# Patient Record
Sex: MALE | NOT HISPANIC OR LATINO | URBAN - METROPOLITAN AREA
[De-identification: names, ages, dates, MRNs, and addresses within clinical notes are randomized per-mention and may not be internally consistent; named-entity substitution may affect disease eponyms.]

---

## 2017-02-21 ENCOUNTER — IMPORTED ENCOUNTER (OUTPATIENT)
Dept: URBAN - METROPOLITAN AREA CLINIC 1 | Facility: CLINIC | Age: 82
End: 2017-02-21

## 2017-02-21 PROBLEM — H04.123: Noted: 2017-02-21

## 2017-02-21 PROCEDURE — 83861 MICROFLUID ANALY TEARS: CPT

## 2017-02-21 PROCEDURE — 99213 OFFICE O/P EST LOW 20 MIN: CPT

## 2017-02-21 NOTE — PATIENT DISCUSSION
1.  Dry Eyes OU --Tear lab was ordered and done today results was 326 OD and 312 OS mild OU. REC patient to cont using Restasis BID OU along with AT QID OU-Gave patient a sample of Systane Balance and Refresh Advanced. Will consider plugs if symptoms cont or worsen 2. Return for an appointment in 2 months for 10. with Dr. Nola Alarcon.

## 2017-07-27 ENCOUNTER — IMPORTED ENCOUNTER (OUTPATIENT)
Dept: URBAN - METROPOLITAN AREA CLINIC 1 | Facility: CLINIC | Age: 82
End: 2017-07-27

## 2017-07-27 PROBLEM — H04.123: Noted: 2017-07-27

## 2017-07-27 PROCEDURE — 99213 OFFICE O/P EST LOW 20 MIN: CPT

## 2017-07-27 NOTE — PATIENT DISCUSSION
1.  Dry Eyes OU -- REC patient to cont using Restasis BID OU and AT TID OU 2. Return for an appointment in 6 months for 30. with Dr. Dilan Kramer.

## 2017-12-12 ENCOUNTER — OTHER- (OUTPATIENT)
Dept: RURAL CLINIC 1 | Facility: CLINIC | Age: 82
Setting detail: DERMATOLOGY
End: 2017-12-12

## 2017-12-12 PROBLEM — L81.4 OTHER MELANIN HYPERPIGMENTATION: Status: RESOLVED | Noted: 2017-12-12

## 2017-12-12 PROBLEM — L82.1 OTHER SEBORRHEIC KERATOSIS: Status: RESOLVED | Noted: 2017-12-12

## 2017-12-12 PROCEDURE — 17110 DESTRUCT B9 LESION 1-14: CPT

## 2017-12-12 PROCEDURE — 17003 DESTRUCT PREMALG LES 2-14: CPT

## 2017-12-12 PROCEDURE — 99202 OFFICE O/P NEW SF 15 MIN: CPT

## 2017-12-12 PROCEDURE — 69100 BIOPSY OF EXTERNAL EAR: CPT

## 2017-12-12 PROCEDURE — 17000 DESTRUCT PREMALG LESION: CPT

## 2018-01-23 ENCOUNTER — IMPORTED ENCOUNTER (OUTPATIENT)
Dept: URBAN - METROPOLITAN AREA CLINIC 1 | Facility: CLINIC | Age: 83
End: 2018-01-23

## 2018-01-23 PROBLEM — E11.9: Noted: 2018-01-23

## 2018-01-23 PROBLEM — Z79.84: Noted: 2018-01-23

## 2018-01-23 PROBLEM — H16.143: Noted: 2018-01-23

## 2018-01-23 PROBLEM — Z96.1: Noted: 2018-01-23

## 2018-01-23 PROBLEM — H04.123: Noted: 2018-01-23

## 2018-01-23 PROCEDURE — 92014 COMPRE OPH EXAM EST PT 1/>: CPT

## 2018-01-23 PROCEDURE — 92015 DETERMINE REFRACTIVE STATE: CPT

## 2018-01-23 NOTE — PATIENT DISCUSSION
1.  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 Stressed the importance of strong glucose control. Advised of importance of at least yearly dilated examinations but to contact us immediately for any problems or concerns. 2. FIONA w/ PEK OU- Cont Restasis BID OU. Recommend ATs TID-QID OU routinely (sample of Systane Balance given) 3. Pseudophakia OU - (Mitrev) MRX for glasses givenReturn for an appointment in 1 year 27 with Dr. Moreno Black.

## 2019-01-24 ENCOUNTER — IMPORTED ENCOUNTER (OUTPATIENT)
Dept: URBAN - METROPOLITAN AREA CLINIC 1 | Facility: CLINIC | Age: 84
End: 2019-01-24

## 2019-01-24 PROBLEM — Z79.84: Noted: 2019-01-24

## 2019-01-24 PROBLEM — H16.143: Noted: 2019-01-24

## 2019-01-24 PROBLEM — E11.9: Noted: 2019-01-24

## 2019-01-24 PROBLEM — Z96.1: Noted: 2019-01-24

## 2019-01-24 PROBLEM — H04.123: Noted: 2019-01-24

## 2019-01-24 PROCEDURE — 92015 DETERMINE REFRACTIVE STATE: CPT

## 2019-01-24 PROCEDURE — 92014 COMPRE OPH EXAM EST PT 1/>: CPT

## 2019-01-24 NOTE — PATIENT DISCUSSION
1.  DM Type II (Oral Meds) -- Without sign of diabetic retinopathy and no blot heme on dilated retinal examination today OU and no Macular Edema OU: Discussed the pathophysiology of diabetes and its effect on the eye and risk of blindness. Stressed the importance of strong glucose control. Advised of importance of at least yearly dilated examinations but to contact us immediately for any problems or concerns. 2. FIONA w/ PEK OU -- Recommend the frequent use of OTC AT's BID-QID OU Routinely (Sample & Coupon of Systane Complete Given). Patients been using Restasis Only Qday OU. Use Restasis BID OU. 3.  Pseudophakia OU (Mitrev) -- Doing well. Letter to Dr. Tommie Sung. Finalized Glasses MRx was given to patient today. Return for an appointment in 1 YR for a 30 OU with Dr. Lila Aldridge.

## 2020-01-23 ENCOUNTER — IMPORTED ENCOUNTER (OUTPATIENT)
Dept: URBAN - METROPOLITAN AREA CLINIC 1 | Facility: CLINIC | Age: 85
End: 2020-01-23

## 2020-01-23 PROBLEM — Z79.84: Noted: 2020-01-23

## 2020-01-23 PROBLEM — E11.9: Noted: 2020-01-23

## 2020-01-23 PROBLEM — H16.143: Noted: 2020-01-23

## 2020-01-23 PROBLEM — H04.123: Noted: 2020-01-23

## 2020-01-23 PROCEDURE — 92014 COMPRE OPH EXAM EST PT 1/>: CPT

## 2020-01-23 NOTE — PATIENT DISCUSSION
1.  DM Type II without sign of diabetic retinopathy and no blot heme on dilated retinal examination today OU No Macular Edema:  Discussed the pathophysiology of diabetes and its effect on the eye and risk of blindness. Stressed the importance of strong glucose control. Advised of importance of at least yearly dilated examinations but to contact us immediately for any problems or concerns. 2. FIONA w/ PEK OU- Cont use of OTC AT's BID-QID OU Routinely. Restart Restasis BID OU (erxd)3. Pseudophakia OU (Mitrev) -- Doing well. Patient deferred Manifest Rx today. Return for an appointment in 1 year 27  with Dr. Claudio Sapp.

## 2020-03-10 RX ORDER — ISOTRETINOIN 40 MG/1
1 CAPSULE CAPSULE, LIQUID FILLED ORAL DAILY
Qty: 30 | Refills: 0
Start: 2020-03-10

## 2020-03-10 RX ORDER — ISOTRETINOIN 30 MG/1
1 CAPSULE CAPSULE, LIQUID FILLED ORAL DAILY
Qty: 30 | Refills: 0
Start: 2020-03-10

## 2021-12-10 ENCOUNTER — IMPORTED ENCOUNTER (OUTPATIENT)
Dept: URBAN - METROPOLITAN AREA CLINIC 1 | Facility: CLINIC | Age: 86
End: 2021-12-10

## 2021-12-10 PROBLEM — R73.09: Noted: 2021-12-10

## 2021-12-10 PROBLEM — H16.143: Noted: 2021-12-10

## 2021-12-10 PROBLEM — H26.491: Noted: 2021-12-10

## 2021-12-10 PROBLEM — Z96.1: Noted: 2021-12-10

## 2021-12-10 PROBLEM — H04.123: Noted: 2021-12-10

## 2021-12-10 PROCEDURE — 99214 OFFICE O/P EST MOD 30 MIN: CPT

## 2022-04-02 ASSESSMENT — VISUAL ACUITY
OD_SC: 20/25
OD_CC: J1
OD_CC: J1+
OS_SC: 20/20
OS_CC: J1
OS_SC: 20/20
OS_SC: 20/20
OD_SC: 20/20
OD_CC: J1
OS_SC: 20/25
OS_SC: 20/20-1
OD_SC: 20/20
OD_SC: 20/20
OS_CC: J1+
OD_SC: 20/20
OS_SC: 20/20
OS_CC: J1
OD_SC: 20/20

## 2022-04-02 ASSESSMENT — TONOMETRY
OS_IOP_MMHG: 15
OS_IOP_MMHG: 15
OD_IOP_MMHG: 16
OS_IOP_MMHG: 16
OD_IOP_MMHG: 16
OD_IOP_MMHG: 16
OS_IOP_MMHG: 20
OD_IOP_MMHG: 20
OD_IOP_MMHG: 16
OS_IOP_MMHG: 17
OD_IOP_MMHG: 15
OS_IOP_MMHG: 16